# Patient Record
Sex: MALE | Race: ASIAN | Employment: FULL TIME | URBAN - METROPOLITAN AREA
[De-identification: names, ages, dates, MRNs, and addresses within clinical notes are randomized per-mention and may not be internally consistent; named-entity substitution may affect disease eponyms.]

---

## 2023-02-16 ENCOUNTER — HOSPITAL ENCOUNTER (EMERGENCY)
Facility: HOSPITAL | Age: 68
Discharge: HOME | End: 2023-02-16
Attending: EMERGENCY MEDICINE
Payer: MEDICARE

## 2023-02-16 VITALS
OXYGEN SATURATION: 97 % | TEMPERATURE: 98 F | RESPIRATION RATE: 18 BRPM | BODY MASS INDEX: 36.8 KG/M2 | HEIGHT: 62 IN | HEART RATE: 74 BPM | DIASTOLIC BLOOD PRESSURE: 94 MMHG | WEIGHT: 200 LBS | SYSTOLIC BLOOD PRESSURE: 166 MMHG

## 2023-02-16 DIAGNOSIS — R51.9 ACUTE NONINTRACTABLE HEADACHE, UNSPECIFIED HEADACHE TYPE: ICD-10-CM

## 2023-02-16 DIAGNOSIS — I10 UNCONTROLLED HYPERTENSION: Primary | ICD-10-CM

## 2023-02-16 PROCEDURE — 99283 EMERGENCY DEPT VISIT LOW MDM: CPT

## 2023-02-16 PROCEDURE — 99284 EMERGENCY DEPT VISIT MOD MDM: CPT

## 2023-02-16 RX ORDER — AMLODIPINE BESYLATE 10 MG/1
10 TABLET ORAL DAILY
COMMUNITY

## 2023-02-16 RX ORDER — BENAZEPRIL HYDROCHLORIDE 20 MG/1
20 TABLET ORAL DAILY
Qty: 90 TABLET | Refills: 1 | Status: SHIPPED | OUTPATIENT
Start: 2023-02-16 | End: 2023-05-17

## 2023-02-16 RX ORDER — ASPIRIN 81 MG/1
81 TABLET, CHEWABLE ORAL DAILY
COMMUNITY

## 2023-02-16 RX ORDER — ATORVASTATIN CALCIUM 20 MG/1
20 TABLET, FILM COATED ORAL NIGHTLY
Qty: 90 TABLET | Refills: 1 | Status: SHIPPED | OUTPATIENT
Start: 2023-02-16 | End: 2023-05-17

## 2023-02-16 RX ORDER — AMLODIPINE BESYLATE 10 MG/1
10 TABLET ORAL DAILY
Qty: 90 TABLET | Refills: 1 | Status: SHIPPED | OUTPATIENT
Start: 2023-02-16 | End: 2023-05-17

## 2023-02-16 RX ORDER — ATORVASTATIN CALCIUM 20 MG/1
20 TABLET, FILM COATED ORAL NIGHTLY
COMMUNITY

## 2023-02-16 RX ORDER — ASPIRIN 81 MG/1
81 TABLET ORAL DAILY
Qty: 90 TABLET | Refills: 1 | Status: SHIPPED | OUTPATIENT
Start: 2023-02-16 | End: 2023-05-17

## 2023-02-16 RX ORDER — BENAZEPRIL HYDROCHLORIDE 20 MG/1
20 TABLET ORAL DAILY
COMMUNITY

## 2023-02-16 NOTE — ED INITIAL ASSESSMENT (HPI)
Pt reports having a headache this morning. Pt states he ran out of all his medications. Pt states he was seen at [address] [place] [address] today and was told to come to the ER. Pt reports he just moved to [place] and does not have a doctor. Pt reports 6/10 headache.

## 2023-02-16 NOTE — ED QUICK NOTES
Patient states he is here to have his prescriptions filled. States he last took his medications a year ago. He denies any complaints during assessment.

## 2023-03-17 ENCOUNTER — PATIENT OUTREACH (OUTPATIENT)
Dept: CASE MANAGEMENT | Age: 68
End: 2023-03-17

## 2023-07-17 ENCOUNTER — TELEPHONE (OUTPATIENT)
Dept: INTERNAL MEDICINE CLINIC | Facility: CLINIC | Age: 68
End: 2023-07-17

## 2023-07-28 ENCOUNTER — OFFICE VISIT (OUTPATIENT)
Dept: INTERNAL MEDICINE CLINIC | Facility: CLINIC | Age: 68
End: 2023-07-28
Payer: MEDICARE

## 2023-07-28 VITALS
WEIGHT: 209 LBS | DIASTOLIC BLOOD PRESSURE: 84 MMHG | SYSTOLIC BLOOD PRESSURE: 134 MMHG | HEIGHT: 62 IN | OXYGEN SATURATION: 99 % | HEART RATE: 81 BPM | BODY MASS INDEX: 38.46 KG/M2 | RESPIRATION RATE: 17 BRPM

## 2023-07-28 DIAGNOSIS — L40.9 PSORIASIS: ICD-10-CM

## 2023-07-28 DIAGNOSIS — H35.30 MACULAR DEGENERATION, UNSPECIFIED LATERALITY, UNSPECIFIED TYPE: ICD-10-CM

## 2023-07-28 DIAGNOSIS — E55.9 VITAMIN D DEFICIENCY: ICD-10-CM

## 2023-07-28 DIAGNOSIS — E11.3551 TYPE 2 DIABETES MELLITUS WITH STABLE PROLIFERATIVE RETINOPATHY OF RIGHT EYE, WITHOUT LONG-TERM CURRENT USE OF INSULIN (HCC): ICD-10-CM

## 2023-07-28 DIAGNOSIS — I10 ESSENTIAL HYPERTENSION: Primary | ICD-10-CM

## 2023-07-28 DIAGNOSIS — E78.5 HYPERLIPIDEMIA, UNSPECIFIED HYPERLIPIDEMIA TYPE: ICD-10-CM

## 2023-07-28 DIAGNOSIS — Z00.00 HEALTH MAINTENANCE EXAMINATION: ICD-10-CM

## 2023-07-28 DIAGNOSIS — H52.4 PRESBYOPIA: ICD-10-CM

## 2023-07-28 PROBLEM — E11.3553 TYPE 2 DIABETES MELLITUS WITH STABLE PROLIFERATIVE RETINOPATHY OF BOTH EYES, WITHOUT LONG-TERM CURRENT USE OF INSULIN (HCC): Status: ACTIVE | Noted: 2023-07-28

## 2023-07-28 PROBLEM — E11.9 TYPE 2 DIABETES MELLITUS WITHOUT COMPLICATION, WITHOUT LONG-TERM CURRENT USE OF INSULIN (HCC): Status: ACTIVE | Noted: 2023-07-28

## 2023-07-28 PROCEDURE — 99204 OFFICE O/P NEW MOD 45 MIN: CPT | Performed by: FAMILY MEDICINE

## 2023-07-28 PROCEDURE — 3079F DIAST BP 80-89 MM HG: CPT | Performed by: FAMILY MEDICINE

## 2023-07-28 PROCEDURE — 1159F MED LIST DOCD IN RCRD: CPT | Performed by: FAMILY MEDICINE

## 2023-07-28 PROCEDURE — 3008F BODY MASS INDEX DOCD: CPT | Performed by: FAMILY MEDICINE

## 2023-07-28 PROCEDURE — 1160F RVW MEDS BY RX/DR IN RCRD: CPT | Performed by: FAMILY MEDICINE

## 2023-07-28 PROCEDURE — 3075F SYST BP GE 130 - 139MM HG: CPT | Performed by: FAMILY MEDICINE

## 2023-07-28 RX ORDER — BETAMETHASONE DIPROPIONATE 0.5 MG/G
1 CREAM TOPICAL 2 TIMES DAILY
Qty: 50 G | Refills: 0 | Status: SHIPPED | OUTPATIENT
Start: 2023-07-28

## 2023-07-28 RX ORDER — ATORVASTATIN CALCIUM 40 MG/1
40 TABLET, FILM COATED ORAL NIGHTLY
COMMUNITY
Start: 2023-06-29

## 2023-07-28 RX ORDER — ASPIRIN 81 MG
1 TABLET, DELAYED RELEASE (ENTERIC COATED) ORAL DAILY
COMMUNITY
Start: 2023-06-30

## 2023-07-28 RX ORDER — BLOOD-GLUCOSE METER
1 EACH MISCELLANEOUS DAILY
Qty: 1 KIT | Refills: 0 | Status: SHIPPED | OUTPATIENT
Start: 2023-07-28

## 2023-07-28 RX ORDER — EZETIMIBE 10 MG/1
10 TABLET ORAL NIGHTLY
COMMUNITY
Start: 2023-06-29

## 2023-07-28 RX ORDER — GLIPIZIDE 10 MG/1
10 TABLET, FILM COATED, EXTENDED RELEASE ORAL DAILY
COMMUNITY
Start: 2023-06-29

## 2023-07-28 RX ORDER — LANCETS
1 EACH MISCELLANEOUS 3 TIMES DAILY
Qty: 200 EACH | Refills: 3 | Status: SHIPPED | OUTPATIENT
Start: 2023-07-28 | End: 2024-07-27

## 2023-07-28 RX ORDER — BLOOD SUGAR DIAGNOSTIC
STRIP MISCELLANEOUS
Qty: 100 STRIP | Refills: 3 | Status: SHIPPED | OUTPATIENT
Start: 2023-07-28

## 2023-07-28 NOTE — PATIENT INSTRUCTIONS
PATIENT INSTRUCTIONS    Thank you for seeing me today, it was a pleasure taking care of you. Please check out at the  and schedule a follow up appointment. Return in about 2 weeks (around 8/11/2023) for medicare visit.   Continue all your medications for now   Check your blood sugars before eating meals occasionally at home, write them down and bring to me   Betamethasone cream (steroid) for your feet - twice a day  Moisturize your feet regularly - Cerave   Can stop your aspirin      Best,  Dr. Samira Rojas

## 2023-07-28 NOTE — ASSESSMENT & PLAN NOTE
Patient with itchy plaque on the bilateral feet, seems consistent with psoriasis  Advised moisturizing regularly with CeraVe  Start betamethasone 0.05% cream.  Follow-up with me in 2 weeks to reassess.

## 2023-07-28 NOTE — ASSESSMENT & PLAN NOTE
Patient reports recently very out of control diabetes. Check hemoglobin A1c, CMP, lipid panel, hep B surface antibody, and urine microalbumin today. For now continue metformin 1000 mg twice daily, glipizide ER 10 mg daily. Seems like some of his diabetes medication was recently adjusted. Advise checking blood sugars occasionally at home. Blood sugars remain significantly elevated, during follow-up visit will consider adding additional medication as well.

## 2023-07-28 NOTE — ASSESSMENT & PLAN NOTE
Check labs. Prevnar 20 vaccine today. Can discontinue aspirin. Follow-up with me for Medicare visit in 2 weeks.

## 2023-07-28 NOTE — ASSESSMENT & PLAN NOTE
Blood pressures are controlled at this time. Continue benazepril 20 mg daily and amlodipine 10 mg daily.

## 2023-07-29 LAB
ABSOLUTE BASOPHILS: 110 CELLS/UL (ref 0–200)
ABSOLUTE EOSINOPHILS: 319 CELLS/UL (ref 15–500)
ABSOLUTE LYMPHOCYTES: 3025 CELLS/UL (ref 850–3900)
ABSOLUTE MONOCYTES: 935 CELLS/UL (ref 200–950)
ABSOLUTE NEUTROPHILS: 6611 CELLS/UL (ref 1500–7800)
ALBUMIN/GLOBULIN RATIO: 1.5 (CALC) (ref 1–2.5)
ALBUMIN: 4.5 G/DL (ref 3.6–5.1)
ALKALINE PHOSPHATASE: 59 U/L (ref 35–144)
ALT: 23 U/L (ref 9–46)
AST: 15 U/L (ref 10–35)
BASOPHILS: 1 %
BILIRUBIN, TOTAL: 0.7 MG/DL (ref 0.2–1.2)
BUN: 16 MG/DL (ref 7–25)
CALCIUM: 10.8 MG/DL (ref 8.6–10.3)
CARBON DIOXIDE: 28 MMOL/L (ref 20–32)
CHLORIDE: 101 MMOL/L (ref 98–110)
CHOL/HDLC RATIO: 2.4 (CALC)
CHOLESTEROL, TOTAL: 164 MG/DL
CREATININE, RANDOM URINE: 225 MG/DL (ref 20–320)
CREATININE: 0.89 MG/DL (ref 0.7–1.35)
EGFR: 95 ML/MIN/1.73M2
EOSINOPHILS: 2.9 %
GLOBULIN: 3.1 G/DL (CALC) (ref 1.9–3.7)
GLUCOSE: 120 MG/DL (ref 65–99)
HDL CHOLESTEROL: 69 MG/DL
HEMATOCRIT: 49.4 % (ref 38.5–50)
HEMOGLOBIN A1C: 10.4 % OF TOTAL HGB
HEMOGLOBIN: 16 G/DL (ref 13.2–17.1)
LDL-CHOLESTEROL: 66 MG/DL (CALC)
LYMPHOCYTES: 27.5 %
MCH: 26.8 PG (ref 27–33)
MCHC: 32.4 G/DL (ref 32–36)
MCV: 82.9 FL (ref 80–100)
MICROALBUMIN/CREATININE RATIO, RANDOM URINE: 1337 MCG/MG CREAT
MICROALBUMIN: 300.9 MG/DL
MONOCYTES: 8.5 %
MPV: 12.4 FL (ref 7.5–12.5)
NEUTROPHILS: 60.1 %
NON-HDL CHOLESTEROL: 95 MG/DL (CALC)
PLATELET COUNT: 202 THOUSAND/UL (ref 140–400)
POTASSIUM: 4 MMOL/L (ref 3.5–5.3)
PROTEIN, TOTAL: 7.6 G/DL (ref 6.1–8.1)
PSA, TOTAL: 4.89 NG/ML
RDW: 12.5 % (ref 11–15)
RED BLOOD CELL COUNT: 5.96 MILLION/UL (ref 4.2–5.8)
SODIUM: 141 MMOL/L (ref 135–146)
TRIGLYCERIDES: 220 MG/DL
TSH W/REFLEX TO FT4: 3.26 MIU/L (ref 0.4–4.5)
VITAMIN B12: 479 PG/ML (ref 200–1100)
VITAMIN D, 25-OH, TOTAL: 37 NG/ML (ref 30–100)
WHITE BLOOD CELL COUNT: 11 THOUSAND/UL (ref 3.8–10.8)

## 2023-07-31 PROBLEM — E55.9 VITAMIN D DEFICIENCY: Status: RESOLVED | Noted: 2023-07-28 | Resolved: 2023-07-31

## 2023-12-29 ENCOUNTER — TELEPHONE (OUTPATIENT)
Dept: INTERNAL MEDICINE CLINIC | Facility: CLINIC | Age: 68
End: 2023-12-29

## 2023-12-29 DIAGNOSIS — L40.9 PSORIASIS: ICD-10-CM

## 2023-12-29 RX ORDER — BETAMETHASONE DIPROPIONATE 0.5 MG/G
1 CREAM TOPICAL 2 TIMES DAILY
Qty: 50 G | Refills: 0 | Status: SHIPPED | OUTPATIENT
Start: 2023-12-29

## 2023-12-29 NOTE — TELEPHONE ENCOUNTER
Patient is calling in requesting refill for skin cream. Patient states he is out of town and has ran out of cream. Patient states he is very itchy. Patient states he is currently at [facility], advised patient Cleo Mohan is in clinic.      Betamethasone Dipropionate Aug 0.05 % External Cream     [facility]Y #[id number], [place] - [address] [facility] [phone], [phone] [[id number]]

## 2023-12-29 NOTE — TELEPHONE ENCOUNTER
Notify patient - refill provided. He is long overdue for his medicare visit. Also needs to get his diabetes rechecked. Please advise that he see me soon for a medicare visit.

## 2023-12-29 NOTE — TELEPHONE ENCOUNTER
Called and spoke to the patient, that Dr Brent Mustafa refilled his medication. And explained to the patient he needs to schedule his annual well visit and check his diabetes. He said he is out [place] [address] for 3 months and when he returns he will schedule a visit to see Dr. Brent Mustafa.

## 2024-02-23 DIAGNOSIS — E78.5 HYPERLIPIDEMIA, UNSPECIFIED HYPERLIPIDEMIA TYPE: ICD-10-CM

## 2024-02-23 RX ORDER — BETAMETHASONE DIPROPIONATE 0.5 MG/G
CREAM TOPICAL
Qty: 50 G | Refills: 11 | Status: SHIPPED | OUTPATIENT
Start: 2024-02-23

## 2024-02-23 RX ORDER — ATORVASTATIN CALCIUM 40 MG/1
TABLET, FILM COATED ORAL
Qty: 90 TABLET | Refills: 0 | Status: SHIPPED | OUTPATIENT
Start: 2024-02-23

## 2024-02-23 NOTE — TELEPHONE ENCOUNTER
A refill request was received for:  Requested Prescriptions     Pending Prescriptions Disp Refills    ATORVASTATIN 40 MG Oral Tab [Pharmacy Med Name: atorvastatin 40 mg tablet] 30 tablet 11     Sig: TAKE ONE TABLET BY MOUTH DAILY AT 9PM NIGHTLY (NO FURTHER REFILLS UNTIL SEEN)    BETAMETHASONE DIPROPIONATE 0.05 % External Cream [Pharmacy Med Name: betamethasone dipropionate 0.05 % topical cream] 50 g 11     Sig: USE 1 APPLICATION TWICE DAILY AS NEEDED     Last refill date:  6/29/23    Last office visit:   7/28/23    No future appointments.

## 2024-02-23 NOTE — TELEPHONE ENCOUNTER
Please call patient and have him see me for his medicare visit. Long overdue. Need to adjust medications. Notify him that his diabetes is not controlled and I need to help him adjust his meds and he needs to see a specialist for his prostate.

## 2024-03-12 DIAGNOSIS — E78.5 HYPERLIPIDEMIA, UNSPECIFIED HYPERLIPIDEMIA TYPE: ICD-10-CM

## 2024-03-12 DIAGNOSIS — E11.3551 TYPE 2 DIABETES MELLITUS WITH STABLE PROLIFERATIVE RETINOPATHY OF RIGHT EYE, WITHOUT LONG-TERM CURRENT USE OF INSULIN (HCC): ICD-10-CM

## 2024-03-12 NOTE — TELEPHONE ENCOUNTER
I left a voicemail message for the patient to call the office to schedule his appointment with Dr. Sierra.

## 2024-03-12 NOTE — TELEPHONE ENCOUNTER
Dr.Chang MCKEON note: Return in about 2 weeks (around 8/11/2023) for medicare visit.     Please schedule patient for appt, Once schedule will refill

## 2024-03-12 NOTE — TELEPHONE ENCOUNTER
Pharmacy called for refills for the patient. Send to pharmacist, Ricardo Ulrich.    Ezetimibe 10 MG Oral Tab    Glipizide ER 10 MG Oral Tablet 24 Hr    [facility] - [address] [facility] [address] [phone], [phone] [address] [facility] [address] [place] [address]   Phone: [phone] Fax: [phone]   Hours: Not open 24 hours

## 2024-03-14 RX ORDER — GLIPIZIDE 10 MG/1
10 TABLET, FILM COATED, EXTENDED RELEASE ORAL DAILY
Qty: 30 TABLET | Refills: 0 | Status: SHIPPED | OUTPATIENT
Start: 2024-03-14

## 2024-03-14 RX ORDER — EZETIMIBE 10 MG/1
10 TABLET ORAL NIGHTLY
Qty: 30 TABLET | Refills: 0 | Status: SHIPPED | OUTPATIENT
Start: 2024-03-14

## 2024-05-26 DIAGNOSIS — E11.3551 TYPE 2 DIABETES MELLITUS WITH STABLE PROLIFERATIVE RETINOPATHY OF RIGHT EYE, WITHOUT LONG-TERM CURRENT USE OF INSULIN (HCC): ICD-10-CM

## 2024-05-28 RX ORDER — GLIPIZIDE 10 MG/1
TABLET, FILM COATED, EXTENDED RELEASE ORAL
Qty: 30 TABLET | Refills: 11 | OUTPATIENT
Start: 2024-05-28

## 2024-05-28 NOTE — TELEPHONE ENCOUNTER
Multiple requests have been sent for him to follow up in office for safe management of the medication. Please let him know a 30 day refill of the mediation can be provided once he schedules an appointment to see me for his medicare visit.    Thanks,  Mat

## 2024-05-28 NOTE — TELEPHONE ENCOUNTER
A refill request was received for:  Requested Prescriptions     Pending Prescriptions Disp Refills    GLIPIZIDE ER 10 MG Oral Tablet 24 Hr [Pharmacy Med Name: glipizide ER 10 mg tablet, extended release 24 hr] 30 tablet 11     Sig: TAKE ONE TABLET (10 MG TOTAL) BY MOUTH DAILY AT 9AM     Last refill date:  3/14/24    Last office visit: 7/28/23      No future appointments.     Rx Refill Note  Requested Prescriptions      No prescriptions requested or ordered in this encounter      Last office visit with prescribing clinician: 3/23/2022      Next office visit with prescribing clinician: Visit date not found            Maddie Glez MA  04/06/22, 10:33 EDT

## 2024-06-29 DIAGNOSIS — L40.9 PSORIASIS: ICD-10-CM

## 2024-07-01 RX ORDER — BETAMETHASONE DIPROPIONATE 0.5 MG/G
1 CREAM TOPICAL 2 TIMES DAILY
Qty: 50 G | Refills: 0 | OUTPATIENT
Start: 2024-07-01

## 2024-07-17 DIAGNOSIS — E11.3551 TYPE 2 DIABETES MELLITUS WITH STABLE PROLIFERATIVE RETINOPATHY OF RIGHT EYE, WITHOUT LONG-TERM CURRENT USE OF INSULIN (HCC): ICD-10-CM

## 2024-07-17 RX ORDER — GLIPIZIDE 10 MG/1
TABLET, FILM COATED, EXTENDED RELEASE ORAL
Qty: 30 TABLET | Refills: 0 | Status: SHIPPED | OUTPATIENT
Start: 2024-07-17

## 2024-07-25 DIAGNOSIS — E11.3551 TYPE 2 DIABETES MELLITUS WITH STABLE PROLIFERATIVE RETINOPATHY OF RIGHT EYE, WITHOUT LONG-TERM CURRENT USE OF INSULIN (HCC): ICD-10-CM

## 2024-07-26 RX ORDER — GLIPIZIDE 10 MG/1
TABLET, FILM COATED, EXTENDED RELEASE ORAL
Qty: 30 TABLET | Refills: 11 | OUTPATIENT
Start: 2024-07-26

## 2024-08-16 DIAGNOSIS — E11.3551 TYPE 2 DIABETES MELLITUS WITH STABLE PROLIFERATIVE RETINOPATHY OF RIGHT EYE, WITHOUT LONG-TERM CURRENT USE OF INSULIN (HCC): ICD-10-CM

## 2024-08-16 RX ORDER — GLIPIZIDE 10 MG/1
TABLET, FILM COATED, EXTENDED RELEASE ORAL
Qty: 30 TABLET | Refills: 0 | OUTPATIENT
Start: 2024-08-16

## 2024-09-16 DIAGNOSIS — E11.3551 TYPE 2 DIABETES MELLITUS WITH STABLE PROLIFERATIVE RETINOPATHY OF RIGHT EYE, WITHOUT LONG-TERM CURRENT USE OF INSULIN (HCC): ICD-10-CM

## 2024-09-16 RX ORDER — GLIPIZIDE 10 MG/1
TABLET, FILM COATED, EXTENDED RELEASE ORAL
Qty: 30 TABLET | Refills: 0 | OUTPATIENT
Start: 2024-09-16

## 2024-09-16 NOTE — TELEPHONE ENCOUNTER
Forward to V/M LVM need appt LOV:7-2023 long overdue for DM and MA visit. Once schedule partial refill

## 2024-10-18 ENCOUNTER — PATIENT OUTREACH (OUTPATIENT)
Dept: CASE MANAGEMENT | Age: 69
End: 2024-10-18

## 2024-10-18 NOTE — PROCEDURES
The office order for PCP removal request is Approved and finalized on October 18, 2024.    Removed Mat Sierra MD as the patient's Primary Care Physician

## 2024-11-14 DIAGNOSIS — E11.3551 TYPE 2 DIABETES MELLITUS WITH STABLE PROLIFERATIVE RETINOPATHY OF RIGHT EYE, WITHOUT LONG-TERM CURRENT USE OF INSULIN (HCC): ICD-10-CM

## 2024-11-14 RX ORDER — GLIPIZIDE 10 MG/1
TABLET, FILM COATED, EXTENDED RELEASE ORAL
Qty: 30 TABLET | Refills: 0 | OUTPATIENT
Start: 2024-11-14
